# Patient Record
Sex: FEMALE | Race: BLACK OR AFRICAN AMERICAN | NOT HISPANIC OR LATINO | Employment: FULL TIME | URBAN - METROPOLITAN AREA
[De-identification: names, ages, dates, MRNs, and addresses within clinical notes are randomized per-mention and may not be internally consistent; named-entity substitution may affect disease eponyms.]

---

## 2019-04-27 ENCOUNTER — HOSPITAL ENCOUNTER (EMERGENCY)
Facility: HOSPITAL | Age: 44
Discharge: HOME | End: 2019-04-27
Attending: EMERGENCY MEDICINE
Payer: MEDICAID

## 2019-04-27 VITALS
HEIGHT: 65 IN | WEIGHT: 190 LBS | BODY MASS INDEX: 31.65 KG/M2 | SYSTOLIC BLOOD PRESSURE: 141 MMHG | TEMPERATURE: 98 F | OXYGEN SATURATION: 97 % | HEART RATE: 68 BPM | DIASTOLIC BLOOD PRESSURE: 66 MMHG | RESPIRATION RATE: 20 BRPM

## 2019-04-27 DIAGNOSIS — B00.52 HERPES KERATITIS OF LEFT EYE: ICD-10-CM

## 2019-04-27 DIAGNOSIS — B02.30 HERPES ZOSTER OPHTHALMICUS OF LEFT EYE: Primary | ICD-10-CM

## 2019-04-27 DIAGNOSIS — H57.12 LEFT EYE PAIN: ICD-10-CM

## 2019-04-27 LAB
B-HCG UR QL: NEGATIVE
CTP QC/QA: YES

## 2019-04-27 PROCEDURE — 25000003 PHARM REV CODE 250: Performed by: NURSE PRACTITIONER

## 2019-04-27 PROCEDURE — 99284 EMERGENCY DEPT VISIT MOD MDM: CPT | Mod: 25

## 2019-04-27 PROCEDURE — 81025 URINE PREGNANCY TEST: CPT | Performed by: NURSE PRACTITIONER

## 2019-04-27 RX ORDER — VALACYCLOVIR HYDROCHLORIDE 500 MG/1
1000 TABLET, FILM COATED ORAL
Status: COMPLETED | OUTPATIENT
Start: 2019-04-27 | End: 2019-04-27

## 2019-04-27 RX ORDER — VALACYCLOVIR HYDROCHLORIDE 1 G/1
1000 TABLET, FILM COATED ORAL 3 TIMES DAILY
Qty: 21 TABLET | Refills: 0 | Status: SHIPPED | OUTPATIENT
Start: 2019-04-27 | End: 2019-05-04

## 2019-04-27 RX ORDER — HYDROCODONE BITARTRATE AND ACETAMINOPHEN 5; 325 MG/1; MG/1
1 TABLET ORAL EVERY 6 HOURS PRN
Qty: 10 TABLET | Refills: 0 | Status: SHIPPED | OUTPATIENT
Start: 2019-04-27

## 2019-04-27 RX ORDER — TRIFLURIDINE 1 G/100ML
1 SOLUTION OPHTHALMIC
Qty: 7.5 ML | Status: SHIPPED | OUTPATIENT
Start: 2019-04-27 | End: 2019-05-07

## 2019-04-27 RX ORDER — TETRACAINE HYDROCHLORIDE 5 MG/ML
2 SOLUTION OPHTHALMIC
Status: COMPLETED | OUTPATIENT
Start: 2019-04-27 | End: 2019-04-27

## 2019-04-27 RX ADMIN — FLUORESCEIN SODIUM 1 EACH: 1 STRIP OPHTHALMIC at 07:04

## 2019-04-27 RX ADMIN — VALACYCLOVIR HYDROCHLORIDE 1000 MG: 500 TABLET, FILM COATED ORAL at 07:04

## 2019-04-27 RX ADMIN — TETRACAINE HYDROCHLORIDE 2 DROP: 5 SOLUTION OPHTHALMIC at 07:04

## 2019-04-27 NOTE — ED TRIAGE NOTES
Patient reports left eye drainage, pressure, itching and burning.  Patient reports a hx of herpes in the same eye.  Denies fever.  Patient reports that she is recovering from the flu.  Patient reports photo sensitivity.

## 2019-04-27 NOTE — ED PROVIDER NOTES
"Encounter Date: 4/27/2019    SCRIBE #1 NOTE: I, Shandra Patterson, am scribing for, and in the presence of,  MARJORIE Vidal. I have scribed the following portions of the note - Other sections scribed: HPI and ROS.       History     Chief Complaint   Patient presents with    Eye Infection     Pt states "I think I have the pink eye". Pt reports she has been sick with cold like symptoms for the past 2 weeks. Pt with complaints of left eye redness, drainage, photosensitivity, eye pain/pressure and eye itching for the past 3 days.      CC: Eye Pain    HPI: This 43 y.o female presents to the ED for an evaluation of acute onset, constant, 8/10 left eye pain described as a pressure with associated left eye drainage and photophobia that began last night.  Patient reports having the sensation of a foreign body in her left eye.  Patient reports being diagnosed with herpes simplex of the left eye in 2000 and reports her symptoms feels similar.  Patient reports attempting cold compresses to the eye, but denies any improvement.  Patient reports having flu-like symptoms for the past 2 weeks, including cough, rhinorrhea, nausea, emesis, and diarrhea; however, she reports those symptoms are resolving.  Patient reports being prescribed eyeglasses, but reports she has not worn them in the past 8 months.  She reports intending to schedule an appointment with her eye doctor for August 2019.  Patient currently denies fever, chills, headache, neck pain, back pain, ear pain, rash, or any other associated symptoms.  No alleviating factors.    The history is provided by the patient. No  was used.     Review of patient's allergies indicates:  No Known Allergies  Past Medical History:   Diagnosis Date    Herpes simplex ophthalmicus 2002     History reviewed. No pertinent surgical history.  History reviewed. No pertinent family history.  Social History     Tobacco Use    Smoking status: Current Every Day Smoker     " Packs/day: 1.00   Substance Use Topics    Alcohol use: Not on file    Drug use: Not on file     Review of Systems   Constitutional: Negative for chills and fever.   HENT: Negative for ear pain and sore throat.    Eyes: Positive for photophobia, pain and discharge.   Respiratory: Negative for cough and shortness of breath.    Cardiovascular: Negative for chest pain.   Gastrointestinal: Negative for abdominal pain, diarrhea, nausea and vomiting.   Genitourinary: Negative for dysuria.   Musculoskeletal: Negative for back pain.   Skin: Negative for rash.   Neurological: Negative for headaches.       Physical Exam     Initial Vitals [04/27/19 0707]   BP Pulse Resp Temp SpO2   (!) 141/66 68 20 98.1 °F (36.7 °C) 97 %      MAP       --         Physical Exam    Nursing note and vitals reviewed.  Constitutional: She appears well-developed and well-nourished. She is not diaphoretic. She is cooperative.  Non-toxic appearance. She does not have a sickly appearance. She does not appear ill. No distress.   HENT:   Head: Normocephalic and atraumatic.   Right Ear: External ear normal.   Left Ear: External ear normal.   Nose: Mucosal edema and rhinorrhea present.   Eyes: EOM and lids are normal. Pupils are equal, round, and reactive to light. Right conjunctiva is not injected. Left conjunctiva is injected. No scleral icterus.   Slit lamp exam:       The right eye shows no foreign body.        The left eye shows fluorescein uptake. The left eye shows no foreign body.       Eye History: she does not wear contact lenses and is prescribed glasses however has not worn them in over a year.  Visual Acuity: OD: 20/70; OS: 20/40; OU: 20/50.  Intraocular Pressure: OD: 18/18; OS: 16/18.  Physical Exam:     OU: Pupils are equal, round, and reactive to light with EOM's intact in all directions. There are no foreign body identified on the eye itself.     OD: Pupil round and reactive to light with EOM's intact in all directions. There is no  photophobia with direct light. There was no fluroescein uptake on Wood's Lamp exam and a Negative Hanna's sign. There is no corneal abrasion, conjunctival abrasion, dendritic lesion, hyphema, or subconjunctival hemorrhage.     OS: Pupil round and reactive to light with EOM's intact in all directions. There is photophobia with direct light. There was fluroescein uptake on Wood's Lamp exam and a Negative Hanna's sign. There appears to be dendritic lesions between 11:00 a.m. and 1:00 a.m. over the iris visualized on slit lamp exam. Fundoscopic Exam with no papilledema appreciated.      Neck: Normal range of motion and phonation normal. Neck supple. No tracheal deviation and normal range of motion present. No neck rigidity.   Cardiovascular: Normal rate and regular rhythm.   Pulmonary/Chest: Effort normal and breath sounds normal. No stridor. No tachypnea and no bradypnea. No respiratory distress. She has no wheezes. She has no rhonchi. She has no rales. She exhibits no tenderness.   Neurological: She is alert and oriented to person, place, and time. She has normal strength. No sensory deficit. Coordination and gait normal. GCS eye subscore is 4. GCS verbal subscore is 5. GCS motor subscore is 6.   Skin: Skin is warm, dry and intact. No bruising and no rash noted. No cyanosis or erythema. Nails show no clubbing.   Negative Galicia sign.   Psychiatric: She has a normal mood and affect. Her behavior is normal. Judgment and thought content normal.         ED Course   Procedures  Labs Reviewed   POCT URINE PREGNANCY          Imaging Results    None                APC / Resident Notes:   This is an evaluation of a 43 y.o. female that presents to the Emergency Department for left eye pain with photophobia, discharge, and pressure sensation. Physical Exam shows a non-toxic, afebrile, and well appearing female. PERRL. EOM's intact and without pain.  There is no proptosis, scleral icterus , erythema or increased warmth in  periorbital area.  There is photophobia of the left eye.  There is fluorescein uptake in the left eye with concern for dendritic lesion appearance.  There is conjunctival injection. No findings of open globe injury. There is no facial rash and a negative Galicia's sign. After instillation topical anesthetic, pain in the left eye improved.  Ears and throat without evidence infection.  Rhinorrhea with posterior pharyngeal cobblestoning.  Vital Signs Are Reassuring. If available, previous records reviewed. RESULTS:  UPT negative. Ophthalmology recommendations:  See below.    My overall impression is herpes ophthalmicus - left eye and dendritic keratitis. I considered, but at this time, do not suspect acute angle closure glaucoma, orbital or preseptal cellulitis, open globe injury.     ED Course:  Acyclovir. D/C Meds:  Acyclovir, Viroptic, Norco. D/C Information:  Importance of following with Ophthalmology on Monday. The diagnosis, treatment plan, instructions for follow-up and reevaluation with opthalmology as well as ED return precautions were discussed and understanding was verbalized. All questions or concerns have been addressed. This case was discussed with and the patient has been examined by Dr. Sy who is in agreement with my assessment and plan.  SETH Vitale, FNP-C         Scribe Attestation:   Scribe #1: I performed the above scribed service and the documentation accurately describes the services I performed. I attest to the accuracy of the note.    Attending Attestation:     Physician Attestation Statement for NP/PA:       Other NP/PA Attestation Additions:       Procedure Note: I am cosigning this chart. I was available for the mid level provider during the visit.  Unless otherwise documented by the provider, I may not have performed an examination with face time or reviewed the presentation with the mid level provider before discharge.       Physician Attestation for Scribe:  Physician  Attestation Statement for Scribe #1: I, Tiburcio Lindsay, NP-C, reviewed documentation, as scribed by Shandra Patterson in my presence, and it is both accurate and complete.                 ED Course as of Apr 28 0625   Sat Apr 27, 2019   0736 Dr. Sy at bedside to independently examine the patient.  Findings concerning for herpes ophthalmicus.  Ophthalmology paged to discuss findings.    [AF]   7339 Dr. Burr returned the phone call.  Would like Valtrex and Viroptic 1 drop the times a day.  Recommendations for clinic follow-up in the ophthalmology clinic.    [AF]      ED Course User Index  [AF] KELLY Robles     Clinical Impression:       ICD-10-CM ICD-9-CM   1. Herpes zoster ophthalmicus of left eye B02.30 053.29   2. Left eye pain H57.12 379.91   3. Herpes keratitis of left eye B00.52 054.43         Disposition:   Disposition: Discharged  Condition: Stable                        KELLY Robles  04/27/19 0810       Rick Sy MD  04/28/19 0646

## 2019-04-27 NOTE — DISCHARGE INSTRUCTIONS
Please return to the Emergency Department for any new or worsening symptoms including: worsening or changes in your pain or vision, fever, chest pain, shortness of breath, loss of consciousness, dizziness, weakness, or any other concerns.     Please follow up with an Eye Doctor on Monday. Leave a message with your eye doctor or call the [facility] Appt. Desk Today to schedule an appointment on Monday. If you do not have one, you may contact the one listed on your discharge paperwork or you may also call the [facility] Appointment Desk at [phone] to schedule an appointment with one.     Please take all medication as prescribed. You have been prescribed Norco (Hydrocodone) for pain. Please do not take this medication while working, drinking alcohol, swimming, or while driving/operating heavy machinery. This medication may cause drowsiness, dizziness, impair judgment, and reduce physical capabilities.You should not drive, operate heavy machinery, or make life changing decisions while taking this medication.      This medication contains Tylenol. Please do not take any additional Tylenol while you are taking this medication.

## 2019-05-01 ENCOUNTER — HOSPITAL ENCOUNTER (EMERGENCY)
Facility: HOSPITAL | Age: 44
Discharge: HOME | End: 2019-05-01
Attending: EMERGENCY MEDICINE
Payer: MEDICAID

## 2019-05-01 VITALS
WEIGHT: 200 LBS | TEMPERATURE: 98 F | DIASTOLIC BLOOD PRESSURE: 81 MMHG | BODY MASS INDEX: 35.44 KG/M2 | OXYGEN SATURATION: 100 % | HEART RATE: 62 BPM | SYSTOLIC BLOOD PRESSURE: 147 MMHG | RESPIRATION RATE: 18 BRPM | HEIGHT: 63 IN

## 2019-05-01 DIAGNOSIS — B02.30 HERPES ZOSTER OPHTHALMICUS: Primary | ICD-10-CM

## 2019-05-01 DIAGNOSIS — H57.12 LEFT EYE PAIN: ICD-10-CM

## 2019-05-01 PROCEDURE — 99283 EMERGENCY DEPT VISIT LOW MDM: CPT

## 2019-05-01 PROCEDURE — 25000003 PHARM REV CODE 250: Performed by: PHYSICIAN ASSISTANT

## 2019-05-01 RX ORDER — PROPARACAINE HYDROCHLORIDE 5 MG/ML
1 SOLUTION/ DROPS OPHTHALMIC
Status: COMPLETED | OUTPATIENT
Start: 2019-05-01 | End: 2019-05-01

## 2019-05-01 RX ADMIN — FLUORESCEIN SODIUM 1 EACH: 1 STRIP OPHTHALMIC at 10:05

## 2019-05-01 RX ADMIN — PROPARACAINE HYDROCHLORIDE 1 DROP: 5 SOLUTION/ DROPS OPHTHALMIC at 10:05

## 2019-05-01 NOTE — DISCHARGE INSTRUCTIONS
It is very important that you go to your follow-up appointment with Dr. Abad today at 1:00 p.m.  The address is [address] [place] [address] [facility]    Continue taking Valtrex as directed until you finish this prescription.     Return to the ER for any emergency.

## 2019-05-01 NOTE — ED TRIAGE NOTES
"Pt here for left eye pain, light sensitivity, drainage. Pt reports being seen in ED on Sat. "I have herpes in my eye and it hurts. Im out of my drops". Pt was unable to see her eye doctor due to him being on vacation.   "

## 2019-05-01 NOTE — ED PROVIDER NOTES
"Encounter Date: 5/1/2019       History     Chief Complaint   Patient presents with    Eye Pain     reports having left eye pain since Thursday, reports being seen her on Saturday     CC: Eye pain    HPI:  43-year-old female with recent herpes ophthalmicus presents for consideration of left eye pain.  Patient reports compliance with bowel cycle of your and Norco, but has been unable to get Viroptic eyedrops from pharmacy.  She reports persistent left eye pain, blurred vision, clear tearing and photophobia since that time.  She has been unable to see her eye doctor, and states that he is out of town." She denies further symptoms.         Review of patient's allergies indicates:  No Known Allergies  Past Medical History:   Diagnosis Date    Herpes simplex ophthalmicus 2002     History reviewed. No pertinent surgical history.  History reviewed. No pertinent family history.  Social History     Tobacco Use    Smoking status: Current Every Day Smoker     Packs/day: 1.00   Substance Use Topics    Alcohol use: Not on file    Drug use: Not on file     Review of Systems   Constitutional: Negative for chills and fever.   HENT: Negative for congestion, ear discharge, ear pain, facial swelling, sinus pressure, sinus pain, sore throat and trouble swallowing.    Eyes: Positive for photophobia, pain, discharge and visual disturbance. Negative for redness and itching.   Respiratory: Negative for cough and shortness of breath.    Cardiovascular: Negative for chest pain.   Gastrointestinal: Negative for abdominal distention, abdominal pain, constipation, diarrhea, nausea and vomiting.   Genitourinary: Negative for decreased urine volume and dysuria.   Musculoskeletal: Negative for back pain and neck pain.   Skin: Negative for rash.   Neurological: Negative for weakness and headaches.   Hematological: Does not bruise/bleed easily.   Psychiatric/Behavioral: Negative for confusion.       Physical Exam     Initial Vitals [05/01/19 " 0822]   BP Pulse Resp Temp SpO2   (!) 156/72 66 18 97.6 °F (36.4 °C) 99 %      MAP       --         Physical Exam    Nursing note and vitals reviewed.  Constitutional: Vital signs are normal. She appears well-developed and well-nourished. She is not diaphoretic. She is cooperative.  Non-toxic appearance. She does not have a sickly appearance. She does not appear ill. No distress.   HENT:   Head: Normocephalic and atraumatic.   Right Ear: Tympanic membrane, external ear and ear canal normal.   Left Ear: Tympanic membrane, external ear and ear canal normal.   Nose: Nose normal.   Mouth/Throat: Uvula is midline, oropharynx is clear and moist and mucous membranes are normal. No oral lesions. No trismus in the jaw. No uvula swelling. No oropharyngeal exudate, posterior oropharyngeal edema or posterior oropharyngeal erythema.   Eyes: EOM and lids are normal. Pupils are equal, round, and reactive to light. Lids are everted and swept, no foreign bodies found. Right eye exhibits no chemosis, no discharge, no exudate and no hordeolum. No foreign body present in the right eye. Left eye exhibits no chemosis, no discharge, no exudate and no hordeolum. No foreign body present in the left eye. Right conjunctiva is not injected. Right conjunctiva has no hemorrhage. Left conjunctiva is injected. Left conjunctiva has no hemorrhage. No scleral icterus. Right eye exhibits no nystagmus. Left eye exhibits no nystagmus.   Slit lamp exam:       The left eye shows no corneal abrasion, no corneal flare, no corneal ulcer, no foreign body, no hyphema, no hypopyon, no fluorescein uptake and no anterior chamber bulge.       Eye History: she does not wear contact lenses and is prescribed glasses however has not worn them in over a year because they are broken.    Visual Acuity: OD: 20/70; OS: 20/100; OU: 20/50.      Physical Exam:   OU: Pupils are equal, round, and reactive to light with EOM's intact in all directions without pain. There are no  foreign body identified on the eye itself. No hazy cornea.     OD: Pupil round and reactive to light with EOM's intact in all directions without pain. There is no photophobia with direct light. There was no fluroescein uptake on Wood's Lamp exam and a Negative Hanna's sign. There is no corneal abrasion, conjunctival abrasion, dendritic lesion, hyphema, or subconjunctival hemorrhage.     OS: Pupil round and reactive to light with EOM's intact in all directions. There is photophobia with direct light. There was fluroescein uptake on Wood's Lamp exam and a Negative Hanna's sign. There are dendritic lesions between 11:00 a.m. and 1:00 a.m. over the iris visualized on slit lamp exam.      Neck: Trachea normal, normal range of motion, full passive range of motion without pain and phonation normal. Neck supple.   Cardiovascular: Normal rate, regular rhythm, normal heart sounds and intact distal pulses. Exam reveals no gallop and no friction rub.    No murmur heard.  Pulmonary/Chest: Effort normal and breath sounds normal. No respiratory distress. She has no decreased breath sounds. She has no wheezes. She has no rhonchi. She has no rales.   Abdominal: Soft. Normal appearance and bowel sounds are normal. She exhibits no distension and no mass. There is no tenderness.   Musculoskeletal: Normal range of motion.   Neurological: She is alert and oriented to person, place, and time. She has normal strength. No cranial nerve deficit or sensory deficit. GCS eye subscore is 4. GCS verbal subscore is 5. GCS motor subscore is 6.   Skin: Skin is warm and dry. Capillary refill takes less than 2 seconds. No rash noted.   Psychiatric: She has a normal mood and affect. Her speech is normal and behavior is normal. Judgment and thought content normal. Cognition and memory are normal.         ED Course   Procedures  Labs Reviewed - No data to display       Imaging Results    None                APC / Resident Notes:   43-year-old female  with recent herpes zoster ophthalmicus presents for consideration of left eye pain. She denies follow-up with opthalmology. She reports compliance with Valtrex and Norco, but left eye pain, clear tearing and photophobia persists. Exam findings consistent with herpes zoster ophthalmicus. Pain improved with proparacaine. No evidence of corneal abrasion, corneal ulcer or foreign body. No orbital or preseptal cellulitis. I have stressed to patient the importance of follow-up with ophthalmology and I have scheduled her an appointment with Dr. Abad at 1:00 PM today at [facility]. She mentions difficulty regarding transportation. I expressed understanding, but also stressed that patient's diagnosis is serious and requires close follow-up; she verbalized understanding of this. I will encourage patient to continue taking Valtrex and Norco as directed. Instructional sheet given with information regarding diagnosis, treatment plan and follow-up instructions during this visit given to patient prior to discharge.    I have discussed this case with my attending, Dr. Sy, who agrees with treatment plan and recommends close follow-up with Ophthalmology.  Landy Briggs PA-C                ED Course as of May 01 1024   Wed May 01, 2019   0957 Called to schedule an eye appointment today for patient; awaiting call back from triage nurse.    [AL]      ED Course User Index  [AL] Landy Briggs PA-C     Clinical Impression:       ICD-10-CM ICD-9-CM   1. Herpes zoster ophthalmicus B02.30 053.20   2. Left eye pain H57.12 379.91         Disposition:   Disposition: Discharged  Condition: Stable                        Landy Briggs PA-C  05/01/19 1024

## 2019-11-22 ENCOUNTER — HOSPITAL ENCOUNTER (EMERGENCY)
Facility: HOSPITAL | Age: 44
Discharge: HOME | End: 2019-11-22
Attending: EMERGENCY MEDICINE

## 2019-11-22 VITALS
WEIGHT: 190 LBS | DIASTOLIC BLOOD PRESSURE: 72 MMHG | HEIGHT: 64 IN | RESPIRATION RATE: 18 BRPM | TEMPERATURE: 98 F | OXYGEN SATURATION: 97 % | HEART RATE: 73 BPM | SYSTOLIC BLOOD PRESSURE: 119 MMHG | BODY MASS INDEX: 32.44 KG/M2

## 2019-11-22 DIAGNOSIS — N30.90 CYSTITIS: Primary | ICD-10-CM

## 2019-11-22 DIAGNOSIS — R05.9 COUGH: ICD-10-CM

## 2019-11-22 LAB
B-HCG UR QL: NEGATIVE
BACTERIA #/AREA URNS HPF: ABNORMAL /HPF
BILIRUB UR QL STRIP: NEGATIVE
CLARITY UR: ABNORMAL
COLOR UR: YELLOW
CTP QC/QA: YES
CTP QC/QA: YES
GLUCOSE UR QL STRIP: NEGATIVE
HGB UR QL STRIP: ABNORMAL
KETONES UR QL STRIP: NEGATIVE
LEUKOCYTE ESTERASE UR QL STRIP: ABNORMAL
MICROSCOPIC COMMENT: ABNORMAL
NITRITE UR QL STRIP: POSITIVE
PH UR STRIP: 6 [PH] (ref 5–8)
POC MOLECULAR INFLUENZA A AGN: NEGATIVE
POC MOLECULAR INFLUENZA B AGN: NEGATIVE
PROT UR QL STRIP: NEGATIVE
RBC #/AREA URNS HPF: 2 /HPF (ref 0–4)
SP GR UR STRIP: 1.01 (ref 1–1.03)
SQUAMOUS #/AREA URNS HPF: ABNORMAL /HPF
URN SPEC COLLECT METH UR: ABNORMAL
UROBILINOGEN UR STRIP-ACNC: NEGATIVE EU/DL
WBC #/AREA URNS HPF: 25 /HPF (ref 0–5)

## 2019-11-22 PROCEDURE — 63600175 PHARM REV CODE 636 W HCPCS: Performed by: PHYSICIAN ASSISTANT

## 2019-11-22 PROCEDURE — 87502 INFLUENZA DNA AMP PROBE: CPT

## 2019-11-22 PROCEDURE — 99284 EMERGENCY DEPT VISIT MOD MDM: CPT | Mod: 25

## 2019-11-22 PROCEDURE — 87086 URINE CULTURE/COLONY COUNT: CPT

## 2019-11-22 PROCEDURE — 87088 URINE BACTERIA CULTURE: CPT

## 2019-11-22 PROCEDURE — 96372 THER/PROPH/DIAG INJ SC/IM: CPT

## 2019-11-22 PROCEDURE — 25000003 PHARM REV CODE 250: Performed by: PHYSICIAN ASSISTANT

## 2019-11-22 PROCEDURE — 81000 URINALYSIS NONAUTO W/SCOPE: CPT

## 2019-11-22 PROCEDURE — 87186 SC STD MICRODIL/AGAR DIL: CPT | Mod: 59

## 2019-11-22 PROCEDURE — 81025 URINE PREGNANCY TEST: CPT | Performed by: PHYSICIAN ASSISTANT

## 2019-11-22 PROCEDURE — 87077 CULTURE AEROBIC IDENTIFY: CPT

## 2019-11-22 RX ORDER — LIDOCAINE HYDROCHLORIDE 10 MG/ML
5 INJECTION INFILTRATION; PERINEURAL
Status: COMPLETED | OUTPATIENT
Start: 2019-11-22 | End: 2019-11-22

## 2019-11-22 RX ORDER — ONDANSETRON 4 MG/1
4 TABLET, ORALLY DISINTEGRATING ORAL EVERY 6 HOURS PRN
Qty: 20 TABLET | Refills: 0 | Status: SHIPPED | OUTPATIENT
Start: 2019-11-22

## 2019-11-22 RX ORDER — GUAIFENESIN/DEXTROMETHORPHAN 100-10MG/5
5 SYRUP ORAL 4 TIMES DAILY PRN
Qty: 120 ML | Refills: 0 | Status: SHIPPED | OUTPATIENT
Start: 2019-11-22 | End: 2019-12-02

## 2019-11-22 RX ORDER — ACETAMINOPHEN 325 MG/1
650 TABLET ORAL
Status: COMPLETED | OUTPATIENT
Start: 2019-11-22 | End: 2019-11-22

## 2019-11-22 RX ORDER — CIPROFLOXACIN 500 MG/1
500 TABLET ORAL 2 TIMES DAILY
Qty: 14 TABLET | Refills: 0 | Status: SHIPPED | OUTPATIENT
Start: 2019-11-22 | End: 2019-11-29

## 2019-11-22 RX ORDER — CEFTRIAXONE 250 MG/1
250 INJECTION, POWDER, FOR SOLUTION INTRAMUSCULAR; INTRAVENOUS
Status: COMPLETED | OUTPATIENT
Start: 2019-11-22 | End: 2019-11-22

## 2019-11-22 RX ORDER — GUAIFENESIN/DEXTROMETHORPHAN 100-10MG/5
10 SYRUP ORAL ONCE
Status: COMPLETED | OUTPATIENT
Start: 2019-11-22 | End: 2019-11-22

## 2019-11-22 RX ADMIN — LIDOCAINE HYDROCHLORIDE 5 ML: 10 INJECTION, SOLUTION INFILTRATION; PERINEURAL at 03:11

## 2019-11-22 RX ADMIN — ACETAMINOPHEN 650 MG: 325 TABLET ORAL at 02:11

## 2019-11-22 RX ADMIN — CEFTRIAXONE SODIUM 250 MG: 250 INJECTION, POWDER, FOR SOLUTION INTRAMUSCULAR; INTRAVENOUS at 03:11

## 2019-11-22 RX ADMIN — GUAIFENESIN AND DEXTROMETHORPHAN 10 ML: 100; 10 SYRUP ORAL at 02:11

## 2019-11-22 NOTE — DISCHARGE INSTRUCTIONS
Drink lots of fluids, stay well hydrated. Tylenol/Ibuprofen as needed for discomfort; go back and forth between these two medications every 4 hrs as needed for temp greater than 100.4F, as needed for congestion/headache/body aches. Robitussin for cough.     Take all antibiotics as prescribed.  Try to take with meals to limit nausea.  Zofran for persistent nausea.    Follow-up with primary care provider for reevaluation, further recommendations.Return to this ED if unable to treat fever, if symptoms persist or worsen despite treatment, if you begin with shortness of breath or difficulty breathing,  if you begin with fever, if any other problems occur.

## 2019-11-22 NOTE — ED PROVIDER NOTES
Encounter Date: 11/22/2019       History     Chief Complaint   Patient presents with    Vomiting     for 2 days    Diarrhea    Generalized Body Aches     44-year-old female with tobacco abuse, with chief complaint nonproductive cough, low back pain, myalgias, nausea with emesis, all x2 days.  No urinary complaints. No vaginal complaints. No neck pain or stiffness.  No shortness of breath, dyspnea on exertion, or chest pain.  No abdominal pain. Fever and chills a few days ago, however have resolved.  Admits to sick contacts at work.  No other complaints at this time.  Symptoms are acute, constant, severity 5/10.  No alleviating or exacerbating factors.  No radiation of symptoms. No treatment.    Low back pain only during cough.        Review of patient's allergies indicates:  No Known Allergies  Past Medical History:   Diagnosis Date    Herpes simplex ophthalmicus 2002     History reviewed. No pertinent surgical history.  History reviewed. No pertinent family history.  Social History     Tobacco Use    Smoking status: Current Every Day Smoker     Packs/day: 1.00   Substance Use Topics    Alcohol use: Never     Frequency: Never    Drug use: Never     Review of Systems   Constitutional: Positive for chills and fever. Negative for appetite change.   HENT: Positive for congestion. Negative for ear discharge, ear pain, rhinorrhea, sore throat and trouble swallowing.    Eyes: Negative.  Negative for discharge and redness.   Respiratory: Positive for cough. Negative for apnea, chest tightness and shortness of breath.    Cardiovascular: Negative for chest pain, palpitations and leg swelling.   Gastrointestinal: Positive for nausea and vomiting. Negative for abdominal pain, constipation and diarrhea.   Endocrine: Negative.    Genitourinary: Negative for difficulty urinating, dysuria, flank pain, frequency, hematuria, pelvic pain, vaginal bleeding, vaginal discharge and vaginal pain.   Musculoskeletal: Positive for back  pain. Negative for myalgias, neck pain and neck stiffness.   Skin: Negative for rash.   Neurological: Negative for weakness and headaches.   Hematological: Does not bruise/bleed easily.   Psychiatric/Behavioral: Negative.    All other systems reviewed and are negative.      Physical Exam     Initial Vitals [11/22/19 0035]   BP Pulse Resp Temp SpO2   137/63 73 15 98.4 °F (36.9 °C) 99 %      MAP       --         Physical Exam    Nursing note and vitals reviewed.  Constitutional: She appears well-developed and well-nourished. She is not diaphoretic. No distress.   Well-appearing and nontoxic.  Resting comfortably on exam table.  Speaking in full sentences without pause or difficulty.   HENT:   Head: Normocephalic and atraumatic.   Mouth/Throat: Oropharynx is clear and moist.   Mucous membranes moist   Eyes: Conjunctivae and EOM are normal. Pupils are equal, round, and reactive to light.   Neck: Normal range of motion. Neck supple. No tracheal deviation present.   Cardiovascular: Intact distal pulses.   Pulmonary/Chest: Breath sounds normal. No stridor. No respiratory distress. She has no wheezes.   Abdominal: Soft. Bowel sounds are normal. She exhibits no distension. There is no tenderness.   No flank or CVA tenderness.  No suprapubic tenderness.   Musculoskeletal: Normal range of motion. She exhibits no tenderness.   Lymphadenopathy:     She has no cervical adenopathy.   Neurological: She is alert and oriented to person, place, and time. GCS score is 15. GCS eye subscore is 4. GCS verbal subscore is 5. GCS motor subscore is 6.   Skin: Skin is warm and dry. Capillary refill takes less than 2 seconds.   Psychiatric: She has a normal mood and affect. Her behavior is normal. Judgment and thought content normal.         ED Course   Procedures  Labs Reviewed   URINALYSIS, REFLEX TO URINE CULTURE - Abnormal; Notable for the following components:       Result Value    Appearance, UA Hazy (*)     Occult Blood UA 1+ (*)      Nitrite, UA Positive (*)     Leukocytes, UA 2+ (*)     All other components within normal limits    Narrative:     Preferred Collection Type->Urine, Clean Catch   URINALYSIS MICROSCOPIC - Abnormal; Notable for the following components:    WBC, UA 25 (*)     Bacteria Many (*)     All other components within normal limits    Narrative:     Preferred Collection Type->Urine, Clean Catch   CULTURE, URINE   POCT INFLUENZA A/B MOLECULAR   POCT URINE PREGNANCY          Imaging Results    None          Medical Decision Making:   Differential Diagnosis:   UTI, viral illness, bronchitis, pharyngitis  ED Management:  Urinalysis evidence of cystitis.  No flank pain. Given myalgias and low back discomfort, however, I will treat with Cipro b.i.d. x7 days.    Lungs are clear, no respiratory distress, no shortness of breath, no thoracic pain with cough I do not suspect pneumonia.                                 Clinical Impression:       ICD-10-CM ICD-9-CM   1. Cystitis N30.90 595.9   2. Cough R05 786.2         Disposition:   Disposition: Discharged  Condition: Stable                            Hector Mccabe PA-C  11/22/19 0259

## 2019-11-24 LAB
BACTERIA UR CULT: ABNORMAL
BACTERIA UR CULT: ABNORMAL

## 2020-05-27 ENCOUNTER — LAB VISIT (OUTPATIENT)
Dept: PRIMARY CARE CLINIC | Facility: CLINIC | Age: 45
End: 2020-05-27
Payer: MEDICAID

## 2020-05-27 DIAGNOSIS — R05.9 COUGH: Primary | ICD-10-CM

## 2020-05-27 PROCEDURE — U0003 INFECTIOUS AGENT DETECTION BY NUCLEIC ACID (DNA OR RNA); SEVERE ACUTE RESPIRATORY SYNDROME CORONAVIRUS 2 (SARS-COV-2) (CORONAVIRUS DISEASE [COVID-19]), AMPLIFIED PROBE TECHNIQUE, MAKING USE OF HIGH THROUGHPUT TECHNOLOGIES AS DESCRIBED BY CMS-2020-01-R: HCPCS

## 2020-05-28 LAB — SARS-COV-2 RNA RESP QL NAA+PROBE: NOT DETECTED
